# Patient Record
Sex: FEMALE | Race: OTHER | HISPANIC OR LATINO | URBAN - METROPOLITAN AREA
[De-identification: names, ages, dates, MRNs, and addresses within clinical notes are randomized per-mention and may not be internally consistent; named-entity substitution may affect disease eponyms.]

---

## 2017-12-14 ENCOUNTER — EMERGENCY (EMERGENCY)
Facility: HOSPITAL | Age: 22
LOS: 1 days | Discharge: HOME | End: 2017-12-14
Attending: EMERGENCY MEDICINE
Payer: MEDICAID

## 2017-12-14 VITALS
DIASTOLIC BLOOD PRESSURE: 66 MMHG | OXYGEN SATURATION: 100 % | HEART RATE: 68 BPM | RESPIRATION RATE: 18 BRPM | TEMPERATURE: 98 F | SYSTOLIC BLOOD PRESSURE: 106 MMHG

## 2017-12-14 VITALS
SYSTOLIC BLOOD PRESSURE: 116 MMHG | RESPIRATION RATE: 18 BRPM | DIASTOLIC BLOOD PRESSURE: 77 MMHG | OXYGEN SATURATION: 100 % | HEIGHT: 60 IN | HEART RATE: 91 BPM | WEIGHT: 126.99 LBS | TEMPERATURE: 99 F

## 2017-12-14 DIAGNOSIS — O20.0 THREATENED ABORTION: ICD-10-CM

## 2017-12-14 DIAGNOSIS — O41.8X10 OTHER SPECIFIED DISORDERS OF AMNIOTIC FLUID AND MEMBRANES, FIRST TRIMESTER, NOT APPLICABLE OR UNSPECIFIED: ICD-10-CM

## 2017-12-14 DIAGNOSIS — Z3A.01 LESS THAN 8 WEEKS GESTATION OF PREGNANCY: ICD-10-CM

## 2017-12-14 LAB
ALBUMIN SERPL ELPH-MCNC: 4.2 G/DL — SIGNIFICANT CHANGE UP (ref 3.3–5.2)
ALP SERPL-CCNC: 50 U/L — SIGNIFICANT CHANGE UP (ref 40–120)
ALT FLD-CCNC: 21 U/L — SIGNIFICANT CHANGE UP
ANION GAP SERPL CALC-SCNC: 13 MMOL/L — SIGNIFICANT CHANGE UP (ref 5–17)
AST SERPL-CCNC: 14 U/L — SIGNIFICANT CHANGE UP
BILIRUB SERPL-MCNC: 0.3 MG/DL — LOW (ref 0.4–2)
BLD GP AB SCN SERPL QL: SIGNIFICANT CHANGE UP
BUN SERPL-MCNC: 10 MG/DL — SIGNIFICANT CHANGE UP (ref 8–20)
CALCIUM SERPL-MCNC: 9.2 MG/DL — SIGNIFICANT CHANGE UP (ref 8.6–10.2)
CHLORIDE SERPL-SCNC: 100 MMOL/L — SIGNIFICANT CHANGE UP (ref 98–107)
CO2 SERPL-SCNC: 24 MMOL/L — SIGNIFICANT CHANGE UP (ref 22–29)
CREAT SERPL-MCNC: 0.38 MG/DL — LOW (ref 0.5–1.3)
GLUCOSE SERPL-MCNC: 86 MG/DL — SIGNIFICANT CHANGE UP (ref 70–115)
HCG SERPL-ACNC: SIGNIFICANT CHANGE UP MIU/ML
HCT VFR BLD CALC: 34.6 % — LOW (ref 37–47)
HGB BLD-MCNC: 11.7 G/DL — LOW (ref 12–16)
MCHC RBC-ENTMCNC: 29.3 PG — SIGNIFICANT CHANGE UP (ref 27–31)
MCHC RBC-ENTMCNC: 33.8 G/DL — SIGNIFICANT CHANGE UP (ref 32–36)
MCV RBC AUTO: 86.7 FL — SIGNIFICANT CHANGE UP (ref 81–99)
PLATELET # BLD AUTO: 292 K/UL — SIGNIFICANT CHANGE UP (ref 150–400)
POTASSIUM SERPL-MCNC: 3.9 MMOL/L — SIGNIFICANT CHANGE UP (ref 3.5–5.3)
POTASSIUM SERPL-SCNC: 3.9 MMOL/L — SIGNIFICANT CHANGE UP (ref 3.5–5.3)
PROT SERPL-MCNC: 7 G/DL — SIGNIFICANT CHANGE UP (ref 6.6–8.7)
RBC # BLD: 3.99 M/UL — LOW (ref 4.4–5.2)
RBC # FLD: 11.7 % — SIGNIFICANT CHANGE UP (ref 11–15.6)
SODIUM SERPL-SCNC: 137 MMOL/L — SIGNIFICANT CHANGE UP (ref 135–145)
TYPE + AB SCN PNL BLD: SIGNIFICANT CHANGE UP
WBC # BLD: 8.6 K/UL — SIGNIFICANT CHANGE UP (ref 4.8–10.8)
WBC # FLD AUTO: 8.6 K/UL — SIGNIFICANT CHANGE UP (ref 4.8–10.8)

## 2017-12-14 PROCEDURE — 76817 TRANSVAGINAL US OBSTETRIC: CPT

## 2017-12-14 PROCEDURE — T1013: CPT

## 2017-12-14 PROCEDURE — 80053 COMPREHEN METABOLIC PANEL: CPT

## 2017-12-14 PROCEDURE — 86900 BLOOD TYPING SEROLOGIC ABO: CPT

## 2017-12-14 PROCEDURE — 76817 TRANSVAGINAL US OBSTETRIC: CPT | Mod: 26

## 2017-12-14 PROCEDURE — 85027 COMPLETE CBC AUTOMATED: CPT

## 2017-12-14 PROCEDURE — 99284 EMERGENCY DEPT VISIT MOD MDM: CPT | Mod: 25

## 2017-12-14 PROCEDURE — 86901 BLOOD TYPING SEROLOGIC RH(D): CPT

## 2017-12-14 PROCEDURE — 76801 OB US < 14 WKS SINGLE FETUS: CPT

## 2017-12-14 PROCEDURE — 86850 RBC ANTIBODY SCREEN: CPT

## 2017-12-14 PROCEDURE — 99285 EMERGENCY DEPT VISIT HI MDM: CPT

## 2017-12-14 PROCEDURE — 76801 OB US < 14 WKS SINGLE FETUS: CPT | Mod: 26

## 2017-12-14 PROCEDURE — 36415 COLL VENOUS BLD VENIPUNCTURE: CPT

## 2017-12-14 PROCEDURE — 84702 CHORIONIC GONADOTROPIN TEST: CPT

## 2017-12-14 NOTE — ED ADULT TRIAGE NOTE - CHIEF COMPLAINT QUOTE
Pt BIBA A&Ox3 6 weeks pregnant, G2:P1 c/o vaginal bleeding with associated lower abdominal pain since this AM.

## 2017-12-14 NOTE — ED PROVIDER NOTE - MEDICAL DECISION MAKING DETAILS
early pregnancy and vag bleeding prob threatened  will get w/u and gyn eval and dispo as per their recommendation

## 2017-12-14 NOTE — CONSULT NOTE ADULT - SUBJECTIVE AND OBJECTIVE BOX
21 yo  at 7 weeks and 3 days by LMP presents to the ED with vaginal bleeding and pelvic pain which began at 9AM this morning and resolved by 11AM. Patient states she noticed bright red blood while on the toilet this morning. She states she wiped with small amount of blood and continued to bleed a little. She states the blood loss was mild-moderate and only used one pad today. Her pain was cramping in nature, 4/10, and similar to that of menstrual cramps. Currently she denies any bleeding, cramping or contractions. She denies LOF. Patient is seen at [facility] and has only had one visit for a urine pregnancy test.    LMP: 10/23/2017  POBhx: 2016  at 40 weeks 3 days  Menarche: 11, Cycles: every 25 days, Lasts: 3 days  PMH: none  PSH: none  Meds: PNV  Allergies: NKDA  Social: neg x3  FH: Grandmother with DM    T(C): 36.8 (17 @ 11:45), Max: 37 (17 @ 09:57)  HR: 67 (17 @ 11:45) (67 - 91)  BP: 100/64 (17 @ 13:45)   RR: 16 (17 @ 11:45) (16 - 18)  SpO2: 99% (17 @ 11:45) (99% - 100%)    Physical Exam:   GENERAL: NAD, well-groomed, well-developed  CHEST/LUNG: CTA; No rales, rhonchi, wheezing, or rubs  HEART: RRR; Normal S1/S2, No murmurs, rubs, or gallops  ABDOMEN: Soft, Nontender, Nondistended; Bowel sounds present, uterus nonpalpable  CE: closed, scant blood in the vaginal vault     Labs:                        11.7   8.6   )-----------( 292      ( 14 Dec 2017 11:51 )             34.6         137  |  100  |  10.0  ----------------------------<  86  3.9   |  24.0  |  0.38<L>    Ca    9.2      14 Dec 2017 11:51    TPro  7.0  /  Alb  4.2  /  TBili  0.3<L>  /  DBili  x   /  AST  14  /  ALT  21  /  AlkPhos  50  12-14    Serum HC,843    Blood Type: A+    Imaging  < from: US Echo OB <=14 Weeks, First Gestation (17 @ 12:56) >  TECHNIQUE: Transabdominal and Endovaginal pelvic sonogram.     FINDINGS:    Uterus: 11.9 x 6 x 7.9 cm. Small subchorionic hemorrhage measuring 4 x   2.2 x 3 cm.    Gestational Sac Size (mean): 3.6 cm    Crown Rump Length: 1.8 cm     Estimated Gestational Age: 8 weeks 4 days    Yolk Sac: Normal.    Fetal Heart Rate: 171    Right ovary: 3.9 x 2.1 x 1.8 cm. 3.1 cm corpus luteal cyst.    Left ovary: 2.5 x 1.5 x 2.6 cm. Within normal limits.    Fluid: None.    IMPRESSION:    Single live intrauterine pregnancy. Small subchorionic hemorrhage.  Estimated gestational age of 8 weeks 4 days.  Estimated due date of  2018.    < end of copied text >

## 2017-12-14 NOTE — CONSULT NOTE ADULT - ASSESSMENT
21 yo  at 7 weeks and 3 days by LMP, 8 weeks 4 days by sono presents for vaginal bleeding and pelvic cramps. 21 yo  at 7 weeks and 3 days by LMP, 8 weeks 4 days by sono presents for vaginal bleeding and pelvic cramps.  Threatened

## 2017-12-14 NOTE — ED ADULT NURSE NOTE - OBJECTIVE STATEMENT
pt via  states she is 6 weeks pregnant, went to use the rest room this morning and started bleeding. pt states she started having pain to her lower abdomen also. pt denies urinary symptoms or cva tenderness. lung fields clear to auscultation and abdomen is soft on palpation

## 2017-12-14 NOTE — ED PROVIDER NOTE - PROGRESS NOTE DETAILS
GYn Dr GOMEZ will see for gyn eval GYN in ED, saw pt, assessment--threatened ab, informed pt of threatened ab precautions, pt knows she has an appointment with her OB/GYN on Wednesday for exam and sono.

## 2017-12-14 NOTE — CONSULT NOTE ADULT - ATTENDING COMMENTS
patient seen and examined at the bedside in ER  on exam no active bleeding noted  sono with live embryo with cardiac activity and subchorionic hemorrhage  explained to patient the implication of the findings and encouraged followup  patient appointment at [facility] on 12/20  patient stable for discharge from ER

## 2017-12-14 NOTE — CONSULT NOTE ADULT - PROBLEM SELECTOR RECOMMENDATION 9
Vaginal bleeding with closed cervical os  OB sono: small subchorionic bleeding  Blood type A+, no rhogam required  Refrain from sex to prevent cervical trauma  Outpatient follow up on Wednesday, repeat sono  Cleared for discharge from OB standpoint with strong precautions

## 2017-12-14 NOTE — ED PROVIDER NOTE - OBJECTIVE STATEMENT
pt wants  to translate 21 y/o female  states lmp 23 oct 7 weeks by pregnanacy test at [facility] clinic was well until about 9 am she noted pelvic cramping and bleeding cramping a little now

## 2017-12-14 NOTE — ED ADULT NURSE REASSESSMENT NOTE - NS ED NURSE REASSESS COMMENT FT1
pt s/p ob consult informed via  of ultra sound results. pt informed by ob resident to not do heavy lifting or pushing, to abstain from sexual intercourse and to take it easy at work until cleared by OB. pt stated her understanding. awaiting OB primary for speculum exam and dispo. pt with low BP normal saline hung

## 2018-02-08 ENCOUNTER — NON-APPOINTMENT (OUTPATIENT)
Age: 23
End: 2018-02-08

## 2018-02-08 ENCOUNTER — APPOINTMENT (OUTPATIENT)
Dept: OBGYN | Facility: CLINIC | Age: 23
End: 2018-02-08
Payer: MEDICAID

## 2018-02-08 ENCOUNTER — LABORATORY RESULT (OUTPATIENT)
Age: 23
End: 2018-02-08

## 2018-02-08 VITALS
SYSTOLIC BLOOD PRESSURE: 118 MMHG | BODY MASS INDEX: 23 KG/M2 | DIASTOLIC BLOOD PRESSURE: 74 MMHG | HEART RATE: 102 BPM | HEIGHT: 62 IN | WEIGHT: 125 LBS

## 2018-02-08 DIAGNOSIS — Z00.00 ENCOUNTER FOR GENERAL ADULT MEDICAL EXAMINATION W/OUT ABNORMAL FINDINGS: ICD-10-CM

## 2018-02-08 PROCEDURE — 76816 OB US FOLLOW-UP PER FETUS: CPT

## 2018-02-08 PROCEDURE — 99214 OFFICE O/P EST MOD 30 MIN: CPT

## 2018-02-09 LAB
ABO + RH PNL BLD: NORMAL
BASOPHILS # BLD AUTO: 0.01 K/UL
BASOPHILS NFR BLD AUTO: 0.1 %
BLD GP AB SCN SERPL QL: NORMAL
C TRACH RRNA SPEC QL NAA+PROBE: NOT DETECTED
EOSINOPHIL # BLD AUTO: 0.14 K/UL
EOSINOPHIL NFR BLD AUTO: 1.6 %
HBA1C MFR BLD HPLC: 5 %
HBV SURFACE AG SER QL: NONREACTIVE
HCG SERPL-MCNC: NORMAL MIU/ML
HCT VFR BLD CALC: 34.3 %
HGB BLD-MCNC: 10.9 G/DL
HIV1+2 AB SPEC QL IA.RAPID: NONREACTIVE
HIVABINT: NORMAL
HPV HIGH+LOW RISK DNA PNL CVX: NOT DETECTED
IMM GRANULOCYTES NFR BLD AUTO: 0.2 %
LYMPHOCYTES # BLD AUTO: 1.75 K/UL
LYMPHOCYTES NFR BLD AUTO: 20.6 %
MAN DIFF?: NORMAL
MCHC RBC-ENTMCNC: 28.6 PG
MCHC RBC-ENTMCNC: 31.8 GM/DL
MCV RBC AUTO: 90 FL
MEV IGG FLD QL IA: 156 AU/ML
MEV IGG+IGM SER-IMP: POSITIVE
MONOCYTES # BLD AUTO: 0.33 K/UL
MONOCYTES NFR BLD AUTO: 3.9 %
MUV AB SER-ACNC: POSITIVE
MUV IGG SER QL IA: 72.9 AU/ML
N GONORRHOEA RRNA SPEC QL NAA+PROBE: NOT DETECTED
NEUTROPHILS # BLD AUTO: 6.25 K/UL
NEUTROPHILS NFR BLD AUTO: 73.6 %
PLATELET # BLD AUTO: 267 K/UL
RBC # BLD: 3.81 M/UL
RBC # FLD: 13.1 %
RUBV IGG FLD-ACNC: 5.3 INDEX
RUBV IGG SER-IMP: POSITIVE
SOURCE TP AMPLIFICATION: NORMAL
T PALLIDUM AB SER QL IA: NEGATIVE
VZV AB TITR SER: NEGATIVE
VZV IGG SER IF-ACNC: 27.4 INDEX
WBC # FLD AUTO: 8.5 K/UL

## 2018-02-12 LAB
ADJUSTED MITOGEN: >10 IU/ML
ADJUSTED TB AG: 0.14 IU/ML
B19V IGG SER QL IA: 0.4 INDEX
B19V IGG+IGM SER-IMP: NEGATIVE
B19V IGG+IGM SER-IMP: NORMAL
B19V IGM FLD-ACNC: 0.2 INDEX
B19V IGM SER-ACNC: NEGATIVE
BACTERIA UR CULT: NORMAL
M TB IFN-G BLD-IMP: NEGATIVE
QUANTIFERON GOLD NIL: 0.03 IU/ML

## 2018-02-14 ENCOUNTER — MEDICATION RENEWAL (OUTPATIENT)
Age: 23
End: 2018-02-14

## 2018-02-14 LAB — CYTOLOGY CVX/VAG DOC THIN PREP: NORMAL

## 2018-02-14 RX ORDER — MULTIVIT-MIN 60/IRON FUM/FOLIC 27 MG-1 MG
27-1 TABLET ORAL DAILY
Qty: 90 | Refills: 2 | Status: DISCONTINUED | COMMUNITY
Start: 2018-02-08 | End: 2018-02-14

## 2018-02-15 LAB — FMR1 GENE MUT ANL BLD/T: NORMAL

## 2018-02-16 LAB
AR GENE MUT ANL BLD/T: NORMAL
CFTR MUT TESTED BLD/T: NORMAL

## 2018-02-26 ENCOUNTER — APPOINTMENT (OUTPATIENT)
Dept: OBGYN | Facility: CLINIC | Age: 23
End: 2018-02-26
Payer: MEDICAID

## 2018-02-26 ENCOUNTER — APPOINTMENT (OUTPATIENT)
Dept: ANTEPARTUM | Facility: CLINIC | Age: 23
End: 2018-02-26

## 2018-02-26 ENCOUNTER — NON-APPOINTMENT (OUTPATIENT)
Age: 23
End: 2018-02-26

## 2018-02-26 VITALS — DIASTOLIC BLOOD PRESSURE: 73 MMHG | HEART RATE: 103 BPM | WEIGHT: 130 LBS | SYSTOLIC BLOOD PRESSURE: 120 MMHG

## 2018-02-26 PROCEDURE — 99213 OFFICE O/P EST LOW 20 MIN: CPT

## 2018-03-05 ENCOUNTER — APPOINTMENT (OUTPATIENT)
Dept: ANTEPARTUM | Facility: CLINIC | Age: 23
End: 2018-03-05
Payer: MEDICAID

## 2018-03-05 ENCOUNTER — ASOB RESULT (OUTPATIENT)
Age: 23
End: 2018-03-05

## 2018-03-05 PROCEDURE — 76811 OB US DETAILED SNGL FETUS: CPT

## 2018-03-05 PROCEDURE — 76817 TRANSVAGINAL US OBSTETRIC: CPT

## 2018-03-26 ENCOUNTER — APPOINTMENT (OUTPATIENT)
Dept: OBGYN | Facility: CLINIC | Age: 23
End: 2018-03-26
Payer: MEDICAID

## 2018-03-26 ENCOUNTER — NON-APPOINTMENT (OUTPATIENT)
Age: 23
End: 2018-03-26

## 2018-03-26 VITALS — WEIGHT: 137 LBS | HEART RATE: 74 BPM | DIASTOLIC BLOOD PRESSURE: 99 MMHG | SYSTOLIC BLOOD PRESSURE: 109 MMHG

## 2018-03-26 PROCEDURE — 90658 IIV3 VACCINE SPLT 0.5 ML IM: CPT

## 2018-03-26 PROCEDURE — 99213 OFFICE O/P EST LOW 20 MIN: CPT

## 2018-03-26 PROCEDURE — 36415 COLL VENOUS BLD VENIPUNCTURE: CPT

## 2018-03-26 PROCEDURE — 90471 IMMUNIZATION ADMIN: CPT

## 2018-03-27 ENCOUNTER — MED ADMIN CHARGE (OUTPATIENT)
Age: 23
End: 2018-03-27

## 2018-03-28 LAB
GLUCOSE 1H P 50 G GLC PO SERPL-MCNC: 60 MG/DL
HGB A MFR BLD: 96.7 %
HGB A2 MFR BLD: 3.3 %
HGB FRACT BLD-IMP: NORMAL

## 2018-03-30 ENCOUNTER — APPOINTMENT (OUTPATIENT)
Dept: ANTEPARTUM | Facility: CLINIC | Age: 23
End: 2018-03-30

## 2018-04-16 ENCOUNTER — NON-APPOINTMENT (OUTPATIENT)
Age: 23
End: 2018-04-16

## 2018-04-16 ENCOUNTER — APPOINTMENT (OUTPATIENT)
Dept: OBGYN | Facility: CLINIC | Age: 23
End: 2018-04-16
Payer: MEDICAID

## 2018-04-16 VITALS — SYSTOLIC BLOOD PRESSURE: 120 MMHG | DIASTOLIC BLOOD PRESSURE: 74 MMHG | WEIGHT: 143 LBS | HEART RATE: 95 BPM

## 2018-04-16 PROCEDURE — 99213 OFFICE O/P EST LOW 20 MIN: CPT

## 2018-04-30 ENCOUNTER — APPOINTMENT (OUTPATIENT)
Dept: ANTEPARTUM | Facility: CLINIC | Age: 23
End: 2018-04-30
Payer: MEDICAID

## 2018-04-30 ENCOUNTER — ASOB RESULT (OUTPATIENT)
Age: 23
End: 2018-04-30

## 2018-04-30 PROCEDURE — 76816 OB US FOLLOW-UP PER FETUS: CPT

## 2018-04-30 PROCEDURE — 76821 MIDDLE CEREBRAL ARTERY ECHO: CPT

## 2018-05-07 ENCOUNTER — APPOINTMENT (OUTPATIENT)
Dept: OBGYN | Facility: CLINIC | Age: 23
End: 2018-05-07
Payer: MEDICAID

## 2018-05-07 ENCOUNTER — NON-APPOINTMENT (OUTPATIENT)
Age: 23
End: 2018-05-07

## 2018-05-07 VITALS
SYSTOLIC BLOOD PRESSURE: 97 MMHG | DIASTOLIC BLOOD PRESSURE: 60 MMHG | WEIGHT: 147 LBS | HEART RATE: 74 BPM | HEIGHT: 62 IN | BODY MASS INDEX: 27.05 KG/M2

## 2018-05-07 PROCEDURE — 90715 TDAP VACCINE 7 YRS/> IM: CPT

## 2018-05-07 PROCEDURE — 99213 OFFICE O/P EST LOW 20 MIN: CPT

## 2018-05-07 PROCEDURE — 90471 IMMUNIZATION ADMIN: CPT

## 2018-05-09 ENCOUNTER — CHART COPY (OUTPATIENT)
Age: 23
End: 2018-05-09

## 2018-05-14 ENCOUNTER — ASOB RESULT (OUTPATIENT)
Age: 23
End: 2018-05-14

## 2018-05-14 ENCOUNTER — APPOINTMENT (OUTPATIENT)
Dept: ANTEPARTUM | Facility: CLINIC | Age: 23
End: 2018-05-14
Payer: MEDICAID

## 2018-05-14 PROCEDURE — 76821 MIDDLE CEREBRAL ARTERY ECHO: CPT

## 2018-05-14 PROCEDURE — 76816 OB US FOLLOW-UP PER FETUS: CPT

## 2018-05-14 PROCEDURE — 76819 FETAL BIOPHYS PROFIL W/O NST: CPT

## 2018-05-21 ENCOUNTER — NON-APPOINTMENT (OUTPATIENT)
Age: 23
End: 2018-05-21

## 2018-05-21 ENCOUNTER — APPOINTMENT (OUTPATIENT)
Dept: OBGYN | Facility: CLINIC | Age: 23
End: 2018-05-21
Payer: MEDICAID

## 2018-05-21 VITALS — WEIGHT: 151 LBS | HEART RATE: 96 BPM | SYSTOLIC BLOOD PRESSURE: 114 MMHG | DIASTOLIC BLOOD PRESSURE: 73 MMHG

## 2018-05-21 PROCEDURE — 99213 OFFICE O/P EST LOW 20 MIN: CPT

## 2018-05-29 ENCOUNTER — ASOB RESULT (OUTPATIENT)
Age: 23
End: 2018-05-29

## 2018-05-29 ENCOUNTER — APPOINTMENT (OUTPATIENT)
Dept: ANTEPARTUM | Facility: CLINIC | Age: 23
End: 2018-05-29
Payer: MEDICAID

## 2018-05-29 PROCEDURE — 76818 FETAL BIOPHYS PROFILE W/NST: CPT

## 2018-06-04 ENCOUNTER — NON-APPOINTMENT (OUTPATIENT)
Age: 23
End: 2018-06-04

## 2018-06-04 ENCOUNTER — APPOINTMENT (OUTPATIENT)
Dept: OBGYN | Facility: CLINIC | Age: 23
End: 2018-06-04
Payer: MEDICAID

## 2018-06-04 VITALS
SYSTOLIC BLOOD PRESSURE: 118 MMHG | HEIGHT: 62 IN | DIASTOLIC BLOOD PRESSURE: 62 MMHG | HEART RATE: 80 BPM | WEIGHT: 154 LBS | BODY MASS INDEX: 28.34 KG/M2

## 2018-06-04 DIAGNOSIS — Z3A.28 28 WEEKS GESTATION OF PREGNANCY: ICD-10-CM

## 2018-06-04 DIAGNOSIS — Z3A.24 24 WEEKS GESTATION OF PREGNANCY: ICD-10-CM

## 2018-06-04 DIAGNOSIS — Z3A.29 29 WEEKS GESTATION OF PREGNANCY: ICD-10-CM

## 2018-06-04 DIAGNOSIS — Z3A.20 20 WEEKS GESTATION OF PREGNANCY: ICD-10-CM

## 2018-06-04 DIAGNOSIS — Z3A.17 17 WEEKS GESTATION OF PREGNANCY: ICD-10-CM

## 2018-06-04 PROCEDURE — 99213 OFFICE O/P EST LOW 20 MIN: CPT

## 2018-06-11 ENCOUNTER — APPOINTMENT (OUTPATIENT)
Dept: ANTEPARTUM | Facility: CLINIC | Age: 23
End: 2018-06-11
Payer: MEDICAID

## 2018-06-11 ENCOUNTER — ASOB RESULT (OUTPATIENT)
Age: 23
End: 2018-06-11

## 2018-06-11 PROCEDURE — 76816 OB US FOLLOW-UP PER FETUS: CPT

## 2018-06-11 PROCEDURE — 76819 FETAL BIOPHYS PROFIL W/O NST: CPT

## 2018-06-12 ENCOUNTER — APPOINTMENT (OUTPATIENT)
Dept: CARDIOLOGY | Facility: CLINIC | Age: 23
End: 2018-06-12
Payer: MEDICAID

## 2018-06-12 VITALS
RESPIRATION RATE: 14 BRPM | DIASTOLIC BLOOD PRESSURE: 62 MMHG | WEIGHT: 156 LBS | HEIGHT: 62 IN | BODY MASS INDEX: 28.71 KG/M2 | HEART RATE: 91 BPM | SYSTOLIC BLOOD PRESSURE: 110 MMHG

## 2018-06-12 DIAGNOSIS — Z78.9 OTHER SPECIFIED HEALTH STATUS: ICD-10-CM

## 2018-06-12 PROCEDURE — 93224 XTRNL ECG REC UP TO 48 HRS: CPT | Mod: 59

## 2018-06-12 PROCEDURE — 99204 OFFICE O/P NEW MOD 45 MIN: CPT

## 2018-06-12 PROCEDURE — 93000 ELECTROCARDIOGRAM COMPLETE: CPT | Mod: 59

## 2018-06-12 RX ORDER — FERROUS SULFATE 325(65) MG
325 (65 FE) TABLET ORAL 3 TIMES DAILY
Qty: 30 | Refills: 6 | Status: ACTIVE | COMMUNITY
Start: 2018-02-26

## 2018-06-14 ENCOUNTER — NON-APPOINTMENT (OUTPATIENT)
Age: 23
End: 2018-06-14

## 2018-06-14 ENCOUNTER — APPOINTMENT (OUTPATIENT)
Dept: OBGYN | Facility: CLINIC | Age: 23
End: 2018-06-14

## 2018-06-14 ENCOUNTER — OTHER (OUTPATIENT)
Age: 23
End: 2018-06-14

## 2018-06-14 PROCEDURE — 99213 OFFICE O/P EST LOW 20 MIN: CPT

## 2018-06-16 LAB
GP B STREP DNA SPEC QL NAA+PROBE: NORMAL
GP B STREP DNA SPEC QL NAA+PROBE: NOT DETECTED
HIV1+2 AB SPEC QL IA.RAPID: NONREACTIVE
SOURCE GBS: NORMAL

## 2018-06-25 ENCOUNTER — APPOINTMENT (OUTPATIENT)
Dept: OBGYN | Facility: CLINIC | Age: 23
End: 2018-06-25
Payer: MEDICAID

## 2018-06-25 ENCOUNTER — NON-APPOINTMENT (OUTPATIENT)
Age: 23
End: 2018-06-25

## 2018-06-25 VITALS
HEIGHT: 62 IN | WEIGHT: 160 LBS | SYSTOLIC BLOOD PRESSURE: 117 MMHG | BODY MASS INDEX: 29.44 KG/M2 | DIASTOLIC BLOOD PRESSURE: 70 MMHG | HEART RATE: 68 BPM

## 2018-06-25 PROCEDURE — 99213 OFFICE O/P EST LOW 20 MIN: CPT

## 2018-06-29 PROBLEM — Z3A.37 37 WEEKS GESTATION OF PREGNANCY: Status: RESOLVED | Noted: 2018-06-25 | Resolved: 2018-06-29

## 2018-06-29 PROBLEM — Z3A.35 35 WEEKS GESTATION OF PREGNANCY: Status: RESOLVED | Noted: 2018-06-14 | Resolved: 2018-06-29

## 2018-06-29 PROBLEM — Z3A.34 34 WEEKS GESTATION OF PREGNANCY: Status: RESOLVED | Noted: 2018-06-04 | Resolved: 2018-06-29

## 2018-06-29 PROBLEM — Z3A.32 32 WEEKS GESTATION OF PREGNANCY: Status: RESOLVED | Noted: 2018-05-21 | Resolved: 2018-06-29

## 2018-07-02 ENCOUNTER — NON-APPOINTMENT (OUTPATIENT)
Age: 23
End: 2018-07-02

## 2018-07-02 ENCOUNTER — APPOINTMENT (OUTPATIENT)
Dept: OBGYN | Facility: CLINIC | Age: 23
End: 2018-07-02
Payer: MEDICAID

## 2018-07-02 VITALS — DIASTOLIC BLOOD PRESSURE: 64 MMHG | WEIGHT: 161 LBS | SYSTOLIC BLOOD PRESSURE: 110 MMHG | HEART RATE: 75 BPM

## 2018-07-02 DIAGNOSIS — Z3A.32 32 WEEKS GESTATION OF PREGNANCY: ICD-10-CM

## 2018-07-02 DIAGNOSIS — Z3A.35 35 WEEKS GESTATION OF PREGNANCY: ICD-10-CM

## 2018-07-02 DIAGNOSIS — Z3A.38 38 WEEKS GESTATION OF PREGNANCY: ICD-10-CM

## 2018-07-02 DIAGNOSIS — Z3A.34 34 WEEKS GESTATION OF PREGNANCY: ICD-10-CM

## 2018-07-02 DIAGNOSIS — Z3A.37 37 WEEKS GESTATION OF PREGNANCY: ICD-10-CM

## 2018-07-02 PROCEDURE — 99213 OFFICE O/P EST LOW 20 MIN: CPT

## 2018-07-03 PROBLEM — Z3A.38 38 WEEKS GESTATION OF PREGNANCY: Status: RESOLVED | Noted: 2018-07-02 | Resolved: 2018-07-03

## 2018-07-09 ENCOUNTER — APPOINTMENT (OUTPATIENT)
Dept: CARDIOLOGY | Facility: CLINIC | Age: 23
End: 2018-07-09

## 2018-07-09 ENCOUNTER — ASOB RESULT (OUTPATIENT)
Age: 23
End: 2018-07-09

## 2018-07-09 ENCOUNTER — APPOINTMENT (OUTPATIENT)
Dept: ANTEPARTUM | Facility: CLINIC | Age: 23
End: 2018-07-09
Payer: MEDICAID

## 2018-07-09 ENCOUNTER — APPOINTMENT (OUTPATIENT)
Dept: OBGYN | Facility: CLINIC | Age: 23
End: 2018-07-09
Payer: MEDICAID

## 2018-07-09 VITALS
BODY MASS INDEX: 30.44 KG/M2 | HEART RATE: 69 BPM | DIASTOLIC BLOOD PRESSURE: 72 MMHG | WEIGHT: 161.25 LBS | HEIGHT: 61 IN | SYSTOLIC BLOOD PRESSURE: 114 MMHG

## 2018-07-09 PROCEDURE — 99213 OFFICE O/P EST LOW 20 MIN: CPT

## 2018-07-09 PROCEDURE — 76816 OB US FOLLOW-UP PER FETUS: CPT

## 2018-07-09 PROCEDURE — 76819 FETAL BIOPHYS PROFIL W/O NST: CPT

## 2018-07-11 DIAGNOSIS — Z3A.38 38 WEEKS GESTATION OF PREGNANCY: ICD-10-CM

## 2018-07-16 ENCOUNTER — NON-APPOINTMENT (OUTPATIENT)
Age: 23
End: 2018-07-16

## 2018-07-16 ENCOUNTER — APPOINTMENT (OUTPATIENT)
Dept: OBGYN | Facility: CLINIC | Age: 23
End: 2018-07-16
Payer: MEDICAID

## 2018-07-16 VITALS
WEIGHT: 164.13 LBS | BODY MASS INDEX: 30.99 KG/M2 | DIASTOLIC BLOOD PRESSURE: 68 MMHG | HEIGHT: 61 IN | SYSTOLIC BLOOD PRESSURE: 106 MMHG

## 2018-07-16 PROCEDURE — 99213 OFFICE O/P EST LOW 20 MIN: CPT

## 2018-07-23 ENCOUNTER — NON-APPOINTMENT (OUTPATIENT)
Age: 23
End: 2018-07-23

## 2018-07-23 ENCOUNTER — APPOINTMENT (OUTPATIENT)
Dept: OBGYN | Facility: CLINIC | Age: 23
End: 2018-07-23
Payer: MEDICAID

## 2018-07-23 VITALS — HEART RATE: 85 BPM | SYSTOLIC BLOOD PRESSURE: 125 MMHG | WEIGHT: 165 LBS | DIASTOLIC BLOOD PRESSURE: 77 MMHG

## 2018-07-23 DIAGNOSIS — Z3A.38 38 WEEKS GESTATION OF PREGNANCY: ICD-10-CM

## 2018-07-23 PROCEDURE — 99213 OFFICE O/P EST LOW 20 MIN: CPT

## 2018-07-23 RX ORDER — TERCONAZOLE 4 MG/G
0.4 CREAM VAGINAL DAILY
Qty: 1 | Refills: 3 | Status: DISCONTINUED | COMMUNITY
Start: 2018-07-09 | End: 2018-07-23

## 2018-07-26 ENCOUNTER — TRANSCRIPTION ENCOUNTER (OUTPATIENT)
Age: 23
End: 2018-07-26

## 2018-07-27 ENCOUNTER — INPATIENT (INPATIENT)
Facility: HOSPITAL | Age: 23
LOS: 2 days | Discharge: HOME | End: 2018-07-30
Attending: OBSTETRICS & GYNECOLOGY | Admitting: OBSTETRICS & GYNECOLOGY
Payer: COMMERCIAL

## 2018-07-27 ENCOUNTER — TRANSCRIPTION ENCOUNTER (OUTPATIENT)
Age: 23
End: 2018-07-27

## 2018-07-27 VITALS
SYSTOLIC BLOOD PRESSURE: 128 MMHG | HEART RATE: 88 BPM | RESPIRATION RATE: 14 BRPM | OXYGEN SATURATION: 96 % | DIASTOLIC BLOOD PRESSURE: 75 MMHG | TEMPERATURE: 98 F

## 2018-07-27 DIAGNOSIS — O35.9XX0 MATERNAL CARE FOR (SUSPECTED) FETAL ABNORMALITY AND DAMAGE, UNSPECIFIED, NOT APPLICABLE OR UNSPECIFIED: ICD-10-CM

## 2018-07-27 LAB
APPEARANCE UR: ABNORMAL
BACTERIA # UR AUTO: ABNORMAL
BASE EXCESS BLDCOA CALC-SCNC: -1.6 MMOL/L — SIGNIFICANT CHANGE UP (ref -2–2)
BASE EXCESS BLDCOV CALC-SCNC: -0.6 MMOL/L — SIGNIFICANT CHANGE UP (ref -2–2)
BASOPHILS # BLD AUTO: 0 K/UL — SIGNIFICANT CHANGE UP (ref 0–0.2)
BASOPHILS NFR BLD AUTO: 0.1 % — SIGNIFICANT CHANGE UP (ref 0–2)
BILIRUB UR-MCNC: NEGATIVE — SIGNIFICANT CHANGE UP
BLD GP AB SCN SERPL QL: SIGNIFICANT CHANGE UP
COLOR SPEC: YELLOW — SIGNIFICANT CHANGE UP
DIFF PNL FLD: ABNORMAL
EOSINOPHIL # BLD AUTO: 0.1 K/UL — SIGNIFICANT CHANGE UP (ref 0–0.5)
EOSINOPHIL NFR BLD AUTO: 0.8 % — SIGNIFICANT CHANGE UP (ref 0–6)
EPI CELLS # UR: ABNORMAL
GAS PNL BLDCOV: 7.33 — SIGNIFICANT CHANGE UP (ref 7.25–7.45)
GLUCOSE UR QL: NEGATIVE MG/DL — SIGNIFICANT CHANGE UP
HCO3 BLDCOA-SCNC: 22 MMOL/L — SIGNIFICANT CHANGE UP (ref 21–29)
HCO3 BLDCOV-SCNC: 23 MMOL/L — SIGNIFICANT CHANGE UP (ref 21–29)
HCT VFR BLD CALC: 35.6 % — LOW (ref 37–47)
HGB BLD-MCNC: 11.7 G/DL — LOW (ref 12–16)
KETONES UR-MCNC: NEGATIVE — SIGNIFICANT CHANGE UP
LEUKOCYTE ESTERASE UR-ACNC: ABNORMAL
LYMPHOCYTES # BLD AUTO: 16.5 % — LOW (ref 20–55)
LYMPHOCYTES # BLD AUTO: 2.1 K/UL — SIGNIFICANT CHANGE UP (ref 1–4.8)
MCHC RBC-ENTMCNC: 30.4 PG — SIGNIFICANT CHANGE UP (ref 27–31)
MCHC RBC-ENTMCNC: 32.9 G/DL — SIGNIFICANT CHANGE UP (ref 32–36)
MCV RBC AUTO: 92.5 FL — SIGNIFICANT CHANGE UP (ref 81–99)
MONOCYTES # BLD AUTO: 0.8 K/UL — SIGNIFICANT CHANGE UP (ref 0–0.8)
MONOCYTES NFR BLD AUTO: 6.1 % — SIGNIFICANT CHANGE UP (ref 3–10)
NEUTROPHILS # BLD AUTO: 9.6 K/UL — HIGH (ref 1.8–8)
NEUTROPHILS NFR BLD AUTO: 75.9 % — HIGH (ref 37–73)
NITRITE UR-MCNC: NEGATIVE — SIGNIFICANT CHANGE UP
PCO2 BLDCOA: 50 MMHG — SIGNIFICANT CHANGE UP (ref 32–68)
PCO2 BLDCOV: 49.3 MMHG — SIGNIFICANT CHANGE UP (ref 29–53)
PH BLDCOA: 7.31 — SIGNIFICANT CHANGE UP (ref 7.18–7.38)
PH UR: 7 — SIGNIFICANT CHANGE UP (ref 5–8)
PLATELET # BLD AUTO: 159 K/UL — SIGNIFICANT CHANGE UP (ref 150–400)
PO2 BLDCOA: 23.5 MMHG — SIGNIFICANT CHANGE UP (ref 5.7–30.5)
PO2 BLDCOA: 26.4 MMHG — SIGNIFICANT CHANGE UP (ref 17–41)
PROT UR-MCNC: NEGATIVE MG/DL — SIGNIFICANT CHANGE UP
RBC # BLD: 3.85 M/UL — LOW (ref 4.4–5.2)
RBC # FLD: 13 % — SIGNIFICANT CHANGE UP (ref 11–15.6)
RBC CASTS # UR COMP ASSIST: ABNORMAL /HPF (ref 0–4)
SAO2 % BLDCOA: SIGNIFICANT CHANGE UP
SAO2 % BLDCOV: SIGNIFICANT CHANGE UP
SP GR SPEC: 1.01 — SIGNIFICANT CHANGE UP (ref 1.01–1.02)
TYPE + AB SCN PNL BLD: SIGNIFICANT CHANGE UP
UROBILINOGEN FLD QL: NEGATIVE MG/DL — SIGNIFICANT CHANGE UP
WBC # BLD: 12.7 K/UL — HIGH (ref 4.8–10.8)
WBC # FLD AUTO: 12.7 K/UL — HIGH (ref 4.8–10.8)
WBC UR QL: ABNORMAL

## 2018-07-27 PROCEDURE — 59514 CESAREAN DELIVERY ONLY: CPT | Mod: U9

## 2018-07-27 RX ORDER — NALBUPHINE HYDROCHLORIDE 10 MG/ML
5 INJECTION, SOLUTION INTRAMUSCULAR; INTRAVENOUS; SUBCUTANEOUS ONCE
Qty: 0 | Refills: 0 | Status: DISCONTINUED | OUTPATIENT
Start: 2018-07-27 | End: 2018-07-30

## 2018-07-27 RX ORDER — SODIUM CHLORIDE 9 MG/ML
1000 INJECTION, SOLUTION INTRAVENOUS
Qty: 0 | Refills: 0 | Status: DISCONTINUED | OUTPATIENT
Start: 2018-07-27 | End: 2018-07-28

## 2018-07-27 RX ORDER — CITRIC ACID/SODIUM CITRATE 300-500 MG
30 SOLUTION, ORAL ORAL ONCE
Qty: 0 | Refills: 0 | Status: COMPLETED | OUTPATIENT
Start: 2018-07-27 | End: 2018-07-27

## 2018-07-27 RX ORDER — SODIUM CHLORIDE 9 MG/ML
1000 INJECTION, SOLUTION INTRAVENOUS
Qty: 0 | Refills: 0 | Status: DISCONTINUED | OUTPATIENT
Start: 2018-07-28 | End: 2018-07-30

## 2018-07-27 RX ORDER — KETOROLAC TROMETHAMINE 30 MG/ML
30 SYRINGE (ML) INJECTION ONCE
Qty: 0 | Refills: 0 | Status: DISCONTINUED | OUTPATIENT
Start: 2018-07-27 | End: 2018-07-28

## 2018-07-27 RX ORDER — ONDANSETRON 8 MG/1
4 TABLET, FILM COATED ORAL ONCE
Qty: 0 | Refills: 0 | Status: DISCONTINUED | OUTPATIENT
Start: 2018-07-27 | End: 2018-07-28

## 2018-07-27 RX ORDER — FERROUS SULFATE 325(65) MG
325 TABLET ORAL DAILY
Qty: 0 | Refills: 0 | Status: DISCONTINUED | OUTPATIENT
Start: 2018-07-28 | End: 2018-07-30

## 2018-07-27 RX ORDER — ACETAMINOPHEN 500 MG
1000 TABLET ORAL ONCE
Qty: 0 | Refills: 0 | Status: DISCONTINUED | OUTPATIENT
Start: 2018-07-27 | End: 2018-07-27

## 2018-07-27 RX ORDER — CEFAZOLIN SODIUM 1 G
2000 VIAL (EA) INJECTION ONCE
Qty: 0 | Refills: 0 | Status: COMPLETED | OUTPATIENT
Start: 2018-07-27 | End: 2018-07-27

## 2018-07-27 RX ORDER — OXYTOCIN 10 UNIT/ML
41.67 VIAL (ML) INJECTION
Qty: 20 | Refills: 0 | Status: DISCONTINUED | OUTPATIENT
Start: 2018-07-27 | End: 2018-07-28

## 2018-07-27 RX ORDER — DOCUSATE SODIUM 100 MG
100 CAPSULE ORAL
Qty: 0 | Refills: 0 | Status: DISCONTINUED | OUTPATIENT
Start: 2018-07-28 | End: 2018-07-30

## 2018-07-27 RX ORDER — SODIUM CHLORIDE 9 MG/ML
1000 INJECTION, SOLUTION INTRAVENOUS
Qty: 0 | Refills: 0 | Status: DISCONTINUED | OUTPATIENT
Start: 2018-07-27 | End: 2018-07-27

## 2018-07-27 RX ORDER — DIPHENHYDRAMINE HCL 50 MG
25 CAPSULE ORAL EVERY 6 HOURS
Qty: 0 | Refills: 0 | Status: DISCONTINUED | OUTPATIENT
Start: 2018-07-28 | End: 2018-07-30

## 2018-07-27 RX ORDER — LANOLIN
1 OINTMENT (GRAM) TOPICAL
Qty: 0 | Refills: 0 | Status: DISCONTINUED | OUTPATIENT
Start: 2018-07-28 | End: 2018-07-30

## 2018-07-27 RX ORDER — DIPHENHYDRAMINE HCL 50 MG
25 CAPSULE ORAL EVERY 4 HOURS
Qty: 0 | Refills: 0 | Status: DISCONTINUED | OUTPATIENT
Start: 2018-07-27 | End: 2018-07-30

## 2018-07-27 RX ORDER — KETOROLAC TROMETHAMINE 30 MG/ML
30 SYRINGE (ML) INJECTION EVERY 6 HOURS
Qty: 0 | Refills: 0 | Status: DISCONTINUED | OUTPATIENT
Start: 2018-07-27 | End: 2018-07-30

## 2018-07-27 RX ORDER — SIMETHICONE 80 MG/1
80 TABLET, CHEWABLE ORAL EVERY 4 HOURS
Qty: 0 | Refills: 0 | Status: DISCONTINUED | OUTPATIENT
Start: 2018-07-28 | End: 2018-07-30

## 2018-07-27 RX ORDER — SODIUM CHLORIDE 9 MG/ML
1000 INJECTION, SOLUTION INTRAVENOUS ONCE
Qty: 0 | Refills: 0 | Status: COMPLETED | OUTPATIENT
Start: 2018-07-27 | End: 2018-07-27

## 2018-07-27 RX ORDER — SODIUM CHLORIDE 9 MG/ML
1000 INJECTION, SOLUTION INTRAVENOUS
Qty: 0 | Refills: 0 | Status: DISCONTINUED | OUTPATIENT
Start: 2018-07-27 | End: 2018-07-30

## 2018-07-27 RX ORDER — DIPHENHYDRAMINE HCL 50 MG
25 CAPSULE ORAL ONCE
Qty: 0 | Refills: 0 | Status: DISCONTINUED | OUTPATIENT
Start: 2018-07-27 | End: 2018-07-30

## 2018-07-27 RX ORDER — TETANUS TOXOID, REDUCED DIPHTHERIA TOXOID AND ACELLULAR PERTUSSIS VACCINE, ADSORBED 5; 2.5; 8; 8; 2.5 [IU]/.5ML; [IU]/.5ML; UG/.5ML; UG/.5ML; UG/.5ML
0.5 SUSPENSION INTRAMUSCULAR ONCE
Qty: 0 | Refills: 0 | Status: COMPLETED | OUTPATIENT
Start: 2018-07-28

## 2018-07-27 RX ORDER — CITRIC ACID/SODIUM CITRATE 300-500 MG
15 SOLUTION, ORAL ORAL EVERY 4 HOURS
Qty: 0 | Refills: 0 | Status: DISCONTINUED | OUTPATIENT
Start: 2018-07-27 | End: 2018-07-27

## 2018-07-27 RX ORDER — OXYTOCIN 10 UNIT/ML
41.67 VIAL (ML) INJECTION
Qty: 20 | Refills: 0 | Status: DISCONTINUED | OUTPATIENT
Start: 2018-07-28 | End: 2018-07-30

## 2018-07-27 RX ORDER — OXYCODONE AND ACETAMINOPHEN 5; 325 MG/1; MG/1
1 TABLET ORAL
Qty: 0 | Refills: 0 | Status: DISCONTINUED | OUTPATIENT
Start: 2018-07-28 | End: 2018-07-30

## 2018-07-27 RX ORDER — OXYTOCIN 10 UNIT/ML
333.33 VIAL (ML) INJECTION
Qty: 20 | Refills: 0 | Status: COMPLETED | OUTPATIENT
Start: 2018-07-27 | End: 2018-07-27

## 2018-07-27 RX ORDER — GLYCERIN ADULT
1 SUPPOSITORY, RECTAL RECTAL AT BEDTIME
Qty: 0 | Refills: 0 | Status: DISCONTINUED | OUTPATIENT
Start: 2018-07-28 | End: 2018-07-30

## 2018-07-27 RX ORDER — NALOXONE HYDROCHLORIDE 4 MG/.1ML
0.1 SPRAY NASAL
Qty: 0 | Refills: 0 | Status: DISCONTINUED | OUTPATIENT
Start: 2018-07-27 | End: 2018-07-30

## 2018-07-27 RX ORDER — IBUPROFEN 200 MG
600 TABLET ORAL EVERY 6 HOURS
Qty: 0 | Refills: 0 | Status: DISCONTINUED | OUTPATIENT
Start: 2018-07-28 | End: 2018-07-30

## 2018-07-27 RX ORDER — OXYCODONE AND ACETAMINOPHEN 5; 325 MG/1; MG/1
2 TABLET ORAL EVERY 6 HOURS
Qty: 0 | Refills: 0 | Status: DISCONTINUED | OUTPATIENT
Start: 2018-07-28 | End: 2018-07-30

## 2018-07-27 RX ORDER — ACETAMINOPHEN 500 MG
650 TABLET ORAL EVERY 6 HOURS
Qty: 0 | Refills: 0 | Status: DISCONTINUED | OUTPATIENT
Start: 2018-07-28 | End: 2018-07-30

## 2018-07-27 RX ORDER — NALBUPHINE HYDROCHLORIDE 10 MG/ML
2.5 INJECTION, SOLUTION INTRAMUSCULAR; INTRAVENOUS; SUBCUTANEOUS EVERY 6 HOURS
Qty: 0 | Refills: 0 | Status: DISCONTINUED | OUTPATIENT
Start: 2018-07-27 | End: 2018-07-30

## 2018-07-27 RX ORDER — CEFAZOLIN SODIUM 1 G
2000 VIAL (EA) INJECTION EVERY 8 HOURS
Qty: 0 | Refills: 0 | Status: COMPLETED | OUTPATIENT
Start: 2018-07-27 | End: 2018-07-28

## 2018-07-27 RX ORDER — OXYTOCIN 10 UNIT/ML
333.33 VIAL (ML) INJECTION
Qty: 20 | Refills: 0 | Status: DISCONTINUED | OUTPATIENT
Start: 2018-07-27 | End: 2018-07-27

## 2018-07-27 RX ORDER — OXYTOCIN 10 UNIT/ML
41.67 VIAL (ML) INJECTION
Qty: 20 | Refills: 0 | Status: DISCONTINUED | OUTPATIENT
Start: 2018-07-27 | End: 2018-07-30

## 2018-07-27 RX ORDER — ONDANSETRON 8 MG/1
4 TABLET, FILM COATED ORAL EVERY 6 HOURS
Qty: 0 | Refills: 0 | Status: DISCONTINUED | OUTPATIENT
Start: 2018-07-27 | End: 2018-07-30

## 2018-07-27 RX ORDER — ACETAMINOPHEN 500 MG
1000 TABLET ORAL ONCE
Qty: 0 | Refills: 0 | Status: DISCONTINUED | OUTPATIENT
Start: 2018-07-27 | End: 2018-07-30

## 2018-07-27 RX ADMIN — Medication 1000 MILLIUNIT(S)/MIN: at 21:52

## 2018-07-27 RX ADMIN — Medication 125 MILLIUNIT(S)/MIN: at 23:09

## 2018-07-27 RX ADMIN — Medication 100 MILLIGRAM(S): at 20:02

## 2018-07-27 RX ADMIN — SODIUM CHLORIDE 125 MILLILITER(S): 9 INJECTION, SOLUTION INTRAVENOUS at 12:15

## 2018-07-27 RX ADMIN — SODIUM CHLORIDE 2000 MILLILITER(S): 9 INJECTION, SOLUTION INTRAVENOUS at 09:38

## 2018-07-27 RX ADMIN — SODIUM CHLORIDE 125 MILLILITER(S): 9 INJECTION, SOLUTION INTRAVENOUS at 08:00

## 2018-07-27 RX ADMIN — Medication 30 MILLILITER(S): at 09:40

## 2018-07-28 LAB
BASOPHILS # BLD AUTO: 0 K/UL — SIGNIFICANT CHANGE UP (ref 0–0.2)
BASOPHILS NFR BLD AUTO: 0.1 % — SIGNIFICANT CHANGE UP (ref 0–2)
EOSINOPHIL # BLD AUTO: 0 K/UL — SIGNIFICANT CHANGE UP (ref 0–0.5)
EOSINOPHIL NFR BLD AUTO: 0.1 % — SIGNIFICANT CHANGE UP (ref 0–6)
HCT VFR BLD CALC: 25.5 % — LOW (ref 37–47)
HGB BLD-MCNC: 8.5 G/DL — LOW (ref 12–16)
LYMPHOCYTES # BLD AUTO: 1.4 K/UL — SIGNIFICANT CHANGE UP (ref 1–4.8)
LYMPHOCYTES # BLD AUTO: 8.7 % — LOW (ref 20–55)
MCHC RBC-ENTMCNC: 30.8 PG — SIGNIFICANT CHANGE UP (ref 27–31)
MCHC RBC-ENTMCNC: 33.3 G/DL — SIGNIFICANT CHANGE UP (ref 32–36)
MCV RBC AUTO: 92.4 FL — SIGNIFICANT CHANGE UP (ref 81–99)
MONOCYTES # BLD AUTO: 0.7 K/UL — SIGNIFICANT CHANGE UP (ref 0–0.8)
MONOCYTES NFR BLD AUTO: 4.5 % — SIGNIFICANT CHANGE UP (ref 3–10)
NEUTROPHILS # BLD AUTO: 13.6 K/UL — HIGH (ref 1.8–8)
NEUTROPHILS NFR BLD AUTO: 86.3 % — HIGH (ref 37–73)
PLATELET # BLD AUTO: 140 K/UL — LOW (ref 150–400)
RBC # BLD: 2.76 M/UL — LOW (ref 4.4–5.2)
RBC # FLD: 13.1 % — SIGNIFICANT CHANGE UP (ref 11–15.6)
T PALLIDUM AB TITR SER: NEGATIVE — SIGNIFICANT CHANGE UP
WBC # BLD: 15.7 K/UL — HIGH (ref 4.8–10.8)
WBC # FLD AUTO: 15.7 K/UL — HIGH (ref 4.8–10.8)

## 2018-07-28 RX ADMIN — Medication 30 MILLIGRAM(S): at 19:59

## 2018-07-28 RX ADMIN — SODIUM CHLORIDE 125 MILLILITER(S): 9 INJECTION, SOLUTION INTRAVENOUS at 04:53

## 2018-07-28 RX ADMIN — Medication 30 MILLIGRAM(S): at 01:56

## 2018-07-28 RX ADMIN — Medication 100 MILLIGRAM(S): at 20:56

## 2018-07-28 RX ADMIN — SIMETHICONE 80 MILLIGRAM(S): 80 TABLET, CHEWABLE ORAL at 20:57

## 2018-07-28 RX ADMIN — Medication 30 MILLIGRAM(S): at 01:41

## 2018-07-28 RX ADMIN — Medication 30 MILLIGRAM(S): at 12:02

## 2018-07-28 RX ADMIN — Medication 30 MILLIGRAM(S): at 12:17

## 2018-07-28 RX ADMIN — Medication 100 MILLIGRAM(S): at 12:02

## 2018-07-28 RX ADMIN — Medication 100 MILLIGRAM(S): at 20:00

## 2018-07-28 RX ADMIN — Medication 325 MILLIGRAM(S): at 12:02

## 2018-07-28 RX ADMIN — Medication 30 MILLIGRAM(S): at 20:59

## 2018-07-28 RX ADMIN — Medication 1 TABLET(S): at 12:01

## 2018-07-28 RX ADMIN — Medication 100 MILLIGRAM(S): at 04:53

## 2018-07-28 NOTE — DISCHARGE NOTE OB - CARE PROVIDER_API CALL
Lui Nagel), Obstetrics and Gynecology  [address] [place] [address]  Phone: [phone]  Fax: [phone]

## 2018-07-28 NOTE — PROGRESS NOTE ADULT - ASSESSMENT
23y year old  s/p primary  at 40wks 5days gestation for NRFHT POD#1.   Encourage ambulation & PO hydration and regular diet. Advised to notify if she continues to feel dizziness.   Encourage mother/baby interaction.   Follow up on CBC.  Plan for discharge on POD#3 if no complications arise.

## 2018-07-28 NOTE — DISCHARGE NOTE OB - CARE PLAN
Principal Discharge DX:	S/P primary low transverse   Goal:	Fast Recovery  Assessment and plan of treatment:	To follow up with Dr. Nagel's office in 1 week after discharge for wound check. Diet and activity as tolerated. Nothing per vagina until seen by cleared by Dr. Nagel.   Take medication as indicated. Please call the office sooner if any complications arise.

## 2018-07-28 NOTE — DISCHARGE NOTE OB - PLAN OF CARE
Fast Recovery To follow up with Dr. Nagel's office in 1 week after discharge for wound check. Diet and activity as tolerated. Nothing per vagina until seen by cleared by Dr. Nagel.   Take medication as indicated. Please call the office sooner if any complications arise.

## 2018-07-28 NOTE — DISCHARGE NOTE OB - PATIENT PORTAL LINK FT
You can access the [facility] Patient Portal, offered by [facility], by registering with the following website: [email or web address]

## 2018-07-28 NOTE — PROGRESS NOTE ADULT - SUBJECTIVE AND OBJECTIVE BOX
INTERVAL HPI/OVERNIGHT EVENTS:  23y Female s/p c section under spinal anesthesia with duramorph for post op analgesia on 7/27/18    Vital Signs Last 24 Hrs  T(C): 36.5 (28 Jul 2018 16:00), Max: 36.9 (28 Jul 2018 09:27)  T(F): 97.7 (28 Jul 2018 16:00), Max: 98.4 (28 Jul 2018 09:27)  HR: 93 (28 Jul 2018 16:00) (79 - 99)  BP: 103/68 (28 Jul 2018 16:00) (103/68 - 128/82)  BP(mean): --  RR: 18 (28 Jul 2018 16:00) (14 - 19)  SpO2: 96% (28 Jul 2018 04:34) (96% - 97%)    Patient seen, doing well, no anesthetic complications or complaints noted or reported.  Pain is controlled.

## 2018-07-28 NOTE — PROGRESS NOTE ADULT - SUBJECTIVE AND OBJECTIVE BOX
23y year old  s/p primary  at 40wks 5days gestation for NRFHT POD#1.   Patient seen and examined at bedside. Patient reports some dizziness this am when she got up to go to the bathroom.    Patient is ambulating, not yet eating, +PO hydration, +voiding, +Flatus, -BM, +Breast feeding and pain is well controlled.  Denies headache, SOB, CP, fever, chills, nausea, vomiting and calf pain.    VS:   Vital Signs Last 24 Hrs  T(C): 36.7 (2018 04:34), Max: 36.8 (2018 21:10)  T(F): 98 (2018 04:34), Max: 98.2 (2018 21:10)  HR: 84 (2018 04:34) (79 - 99)  BP: 109/64 (2018 04:34) (107/67 - 128/82)  BP(mean): --  RR: 18 (2018 04:34) (14 - 19)  SpO2: 96% (2018 04:34) (96% - 97%)    Physical Exam:  General: NAD  Abdomen: +BS, soft, some distention and tender on palpation. Fundus not felt due to distention and tenderness. [place] noted, clean incision site, healing well. Dressing clean, dry, intact.  Ext: No cyanosis, edema or calf tenderness.     Labs:                        11.7   12.7  )-----------( 159      ( 2018 09:00 )             35.6     Urinalysis Basic - ( 2018 09:00 )    Color: Yellow / Appearance: Slightly Turbid / S.010 / pH: x  Gluc: x / Ketone: Negative  / Bili: Negative / Urobili: Negative mg/dL   Blood: x / Protein: Negative mg/dL / Nitrite: Negative   Leuk Esterase: Moderate / RBC: 11-25 /HPF / WBC 11-25   Sq Epi: x / Non Sq Epi: Moderate / Bacteria: Few        Medication:  MEDICATIONS  (STANDING):  ceFAZolin   IVPB 2000 milliGRAM(s) IV Intermittent every 8 hours  dextrose 5% + lactated ringers. 1000 milliLiter(s) (125 mL/Hr) IV Continuous <Continuous>  diphtheria/tetanus/pertussis (acellular) Vaccine (ADAcel) 0.5 milliLiter(s) IntraMuscular once  ferrous    sulfate 325 milliGRAM(s) Oral daily  lactated ringers. 1000 milliLiter(s) (125 mL/Hr) IV Continuous <Continuous>  oxytocin Infusion 41.667 milliUNIT(s)/Min (125 mL/Hr) IV Continuous <Continuous>  oxytocin Infusion 41.667 milliUNIT(s)/Min (125 mL/Hr) IV Continuous <Continuous>  prenatal multivitamin 1 Tablet(s) Oral daily    MEDICATIONS  (PRN):  acetaminophen   Tablet 650 milliGRAM(s) Oral every 6 hours PRN For Temp greater than 38.5 C (101.3 F)  acetaminophen  IVPB. 1000 milliGRAM(s) IV Intermittent once PRN Moderate Pain (4 - 6)  diphenhydrAMINE   Capsule 25 milliGRAM(s) Oral every 6 hours PRN Itching  diphenhydrAMINE   Capsule 25 milliGRAM(s) Oral every 4 hours PRN Pruritus  diphenhydrAMINE   Injectable 25 milliGRAM(s) IV Push once PRN Itching  docusate sodium 100 milliGRAM(s) Oral two times a day PRN Stool Softening  glycerin Suppository - Adult 1 Suppository(s) Rectal at bedtime PRN Constipation  ibuprofen  Tablet 600 milliGRAM(s) Oral every 6 hours PRN Mild pain or headache  ketorolac   Injectable 30 milliGRAM(s) IV Push every 6 hours PRN Moderate Pain (4 - 6)  lanolin Ointment 1 Application(s) Topical every 3 hours PRN Sore Nipples  nalbuphine Injectable 5 milliGRAM(s) IV Push once PRN itching not responding to benadryl  nalbuphine Injectable 2.5 milliGRAM(s) IV Push every 6 hours PRN Pruritus  naloxone Injectable 0.1 milliGRAM(s) IV Push every 3 minutes PRN For ANY of the following changes in patient status:  A. RR LESS THAN 10 breaths per minute, B. Oxygen saturation LESS THAN 90%, C. Sedation score of 6  ondansetron Injectable 4 milliGRAM(s) IV Push every 6 hours PRN Nausea  oxyCODONE    5 mG/acetaminophen 325 mG 1 Tablet(s) Oral every 3 hours PRN Moderate Pain  oxyCODONE    5 mG/acetaminophen 325 mG 2 Tablet(s) Oral every 6 hours PRN Severe Pain  simethicone 80 milliGRAM(s) Chew every 4 hours PRN Gas

## 2018-07-28 NOTE — DISCHARGE NOTE OB - MEDICATION SUMMARY - MEDICATIONS TO TAKE
I will START or STAY ON the medications listed below when I get home from the hospital:    ibuprofen 600 mg oral tablet  -- 1 tab(s) by mouth every 6 hours, As needed, Mild pain or headache  -- Indication: For Mild-Mod pain    oxyCODONE-acetaminophen 5 mg-325 mg oral tablet  -- 1 tab(s) by mouth every 6 hours x 4 days, As Needed -Moderate Pain MDD:4 tablets daily  -- Indication: For Mod- Severe Pain    ferrous sulfate 325 mg (65 mg elemental iron) oral tablet  -- 1 tab(s) by mouth 3 times a day (with meals)   -- Check with your doctor before becoming pregnant.  Do not chew, break, or crush.  May discolor urine or feces.    -- Indication: For Anemia    Prenatal Multivitamins with Folic Acid 1 mg oral tablet  -- 1 tab(s) by mouth once a day  -- Indication: For Delivery    docusate sodium 100 mg oral capsule  -- 1 cap(s) by mouth 2 times a day, As needed, Stool Softening  -- Indication: For Constipation

## 2018-07-28 NOTE — DISCHARGE NOTE OB - HOSPITAL COURSE
22 yo now  s/p primary  at 40.5wks after failed vaginal delivery. She had an uncomplicated surgery and postpartum course.  Patient did well in recovery and was transferred to post partum floor. Post partum course was unremarkable. Upon discharge patient is tolerating PO intake and regular diet, voiding, having bowel movement and ambulating. Patient is in medically optimized condition to be discharged home.

## 2018-07-29 RX ORDER — IBUPROFEN 200 MG
1 TABLET ORAL
Qty: 60 | Refills: 0 | OUTPATIENT
Start: 2018-07-29 | End: 2018-08-12

## 2018-07-29 RX ORDER — FERROUS SULFATE 325(65) MG
1 TABLET ORAL
Qty: 90 | Refills: 0 | OUTPATIENT
Start: 2018-07-29 | End: 2018-08-27

## 2018-07-29 RX ORDER — DOCUSATE SODIUM 100 MG
1 CAPSULE ORAL
Qty: 60 | Refills: 0 | OUTPATIENT
Start: 2018-07-29 | End: 2018-08-27

## 2018-07-29 RX ADMIN — SIMETHICONE 80 MILLIGRAM(S): 80 TABLET, CHEWABLE ORAL at 04:32

## 2018-07-29 RX ADMIN — Medication 600 MILLIGRAM(S): at 23:54

## 2018-07-29 RX ADMIN — Medication 600 MILLIGRAM(S): at 04:32

## 2018-07-29 RX ADMIN — Medication 600 MILLIGRAM(S): at 14:15

## 2018-07-29 RX ADMIN — Medication 325 MILLIGRAM(S): at 11:56

## 2018-07-29 RX ADMIN — Medication 1 TABLET(S): at 11:56

## 2018-07-29 RX ADMIN — Medication 600 MILLIGRAM(S): at 13:15

## 2018-07-29 NOTE — PROGRESS NOTE ADULT - SUBJECTIVE AND OBJECTIVE BOX
23y year old  s/p primary  at 40wks 5days gestation for NRFHT POD#2.   Patient seen and examined at bedside.  Patient is ambulating,  eating, +PO hydration, +voiding, +Flatus, -BM, +Breast feeding and pain is well controlled.  Denies headache, SOB, CP, fever, chills, nausea, vomiting and calf pain.    Vital Signs Last 24 Hrs  T(C): 36.8 (2018 22:04), Max: 36.9 (2018 09:27)  T(F): 98.3 (2018 22:04), Max: 98.4 (2018 09:27)  HR: 96 (2018 22:04) (81 - 96)  BP: 102/66 (2018 22:04) (102/66 - 108/64)  RR: 18 (2018 22:04) (18 - 18)  SpO2: 98% (2018 22:04) (98% - 98%)    Physical Exam:  General: NAD  Abdomen: +BS, soft, some distention and tender on palpation. Fundus not felt due to distention and tenderness. [place] noted, clean incision site, healing well. Dressing clean, dry, intact.  Ext: No cyanosis, edema or calf tenderness.     Labs:                        11.7   12.7  )-----------( 159      ( 2018 09:00 )             35.6     Urinalysis Basic - ( 2018 09:00 )    Color: Yellow / Appearance: Slightly Turbid / S.010 / pH: x  Gluc: x / Ketone: Negative  / Bili: Negative / Urobili: Negative mg/dL   Blood: x / Protein: Negative mg/dL / Nitrite: Negative   Leuk Esterase: Moderate / RBC: 11-25 /HPF / WBC 11-25   Sq Epi: x / Non Sq Epi: Moderate / Bacteria: Few        Medication:  MEDICATIONS  (STANDING):  ceFAZolin   IVPB 2000 milliGRAM(s) IV Intermittent every 8 hours  dextrose 5% + lactated ringers. 1000 milliLiter(s) (125 mL/Hr) IV Continuous <Continuous>  diphtheria/tetanus/pertussis (acellular) Vaccine (ADAcel) 0.5 milliLiter(s) IntraMuscular once  ferrous    sulfate 325 milliGRAM(s) Oral daily  lactated ringers. 1000 milliLiter(s) (125 mL/Hr) IV Continuous <Continuous>  oxytocin Infusion 41.667 milliUNIT(s)/Min (125 mL/Hr) IV Continuous <Continuous>  oxytocin Infusion 41.667 milliUNIT(s)/Min (125 mL/Hr) IV Continuous <Continuous>  prenatal multivitamin 1 Tablet(s) Oral daily    MEDICATIONS  (PRN):  acetaminophen   Tablet 650 milliGRAM(s) Oral every 6 hours PRN For Temp greater than 38.5 C (101.3 F)  acetaminophen  IVPB. 1000 milliGRAM(s) IV Intermittent once PRN Moderate Pain (4 - 6)  diphenhydrAMINE   Capsule 25 milliGRAM(s) Oral every 6 hours PRN Itching  diphenhydrAMINE   Capsule 25 milliGRAM(s) Oral every 4 hours PRN Pruritus  diphenhydrAMINE   Injectable 25 milliGRAM(s) IV Push once PRN Itching  docusate sodium 100 milliGRAM(s) Oral two times a day PRN Stool Softening  glycerin Suppository - Adult 1 Suppository(s) Rectal at bedtime PRN Constipation  ibuprofen  Tablet 600 milliGRAM(s) Oral every 6 hours PRN Mild pain or headache  ketorolac   Injectable 30 milliGRAM(s) IV Push every 6 hours PRN Moderate Pain (4 - 6)  lanolin Ointment 1 Application(s) Topical every 3 hours PRN Sore Nipples  nalbuphine Injectable 5 milliGRAM(s) IV Push once PRN itching not responding to benadryl  nalbuphine Injectable 2.5 milliGRAM(s) IV Push every 6 hours PRN Pruritus  naloxone Injectable 0.1 milliGRAM(s) IV Push every 3 minutes PRN For ANY of the following changes in patient status:  A. RR LESS THAN 10 breaths per minute, B. Oxygen saturation LESS THAN 90%, C. Sedation score of 6  ondansetron Injectable 4 milliGRAM(s) IV Push every 6 hours PRN Nausea  oxyCODONE    5 mG/acetaminophen 325 mG 1 Tablet(s) Oral every 3 hours PRN Moderate Pain  oxyCODONE    5 mG/acetaminophen 325 mG 2 Tablet(s) Oral every 6 hours PRN Severe Pain  simethicone 80 milliGRAM(s) Chew every 4 hours PRN Gas

## 2018-07-29 NOTE — PROGRESS NOTE ADULT - ASSESSMENT
23y year old  s/p primary  at 40wks 5days gestation for NRFHT POD#2.     Encourage ambulation & PO hydration and regular diet.    Encourage mother/baby interaction.   Plan for discharge on POD#3 if no complications arise.   dvt proph : ambulation and  vcd boot

## 2018-07-30 VITALS — HEART RATE: 76 BPM | RESPIRATION RATE: 17 BRPM | DIASTOLIC BLOOD PRESSURE: 70 MMHG | SYSTOLIC BLOOD PRESSURE: 112 MMHG

## 2018-07-30 PROCEDURE — 82803 BLOOD GASES ANY COMBINATION: CPT

## 2018-07-30 PROCEDURE — 81001 URINALYSIS AUTO W/SCOPE: CPT

## 2018-07-30 PROCEDURE — 59025 FETAL NON-STRESS TEST: CPT

## 2018-07-30 PROCEDURE — T1013: CPT

## 2018-07-30 PROCEDURE — 86900 BLOOD TYPING SEROLOGIC ABO: CPT

## 2018-07-30 PROCEDURE — 76815 OB US LIMITED FETUS(S): CPT

## 2018-07-30 PROCEDURE — 59050 FETAL MONITOR W/REPORT: CPT

## 2018-07-30 PROCEDURE — 85027 COMPLETE CBC AUTOMATED: CPT

## 2018-07-30 PROCEDURE — 36415 COLL VENOUS BLD VENIPUNCTURE: CPT

## 2018-07-30 PROCEDURE — 86780 TREPONEMA PALLIDUM: CPT

## 2018-07-30 PROCEDURE — 86850 RBC ANTIBODY SCREEN: CPT

## 2018-07-30 PROCEDURE — 86901 BLOOD TYPING SEROLOGIC RH(D): CPT

## 2018-07-30 PROCEDURE — 90715 TDAP VACCINE 7 YRS/> IM: CPT

## 2018-07-30 PROCEDURE — G0463: CPT

## 2018-07-30 RX ORDER — TETANUS TOXOID, REDUCED DIPHTHERIA TOXOID AND ACELLULAR PERTUSSIS VACCINE, ADSORBED 5; 2.5; 8; 8; 2.5 [IU]/.5ML; [IU]/.5ML; UG/.5ML; UG/.5ML; UG/.5ML
0.5 SUSPENSION INTRAMUSCULAR ONCE
Qty: 0 | Refills: 0 | Status: COMPLETED | OUTPATIENT
Start: 2018-07-30 | End: 2018-07-30

## 2018-07-30 RX ADMIN — Medication 600 MILLIGRAM(S): at 11:15

## 2018-07-30 RX ADMIN — Medication 600 MILLIGRAM(S): at 10:38

## 2018-07-30 RX ADMIN — Medication 600 MILLIGRAM(S): at 00:45

## 2018-07-30 RX ADMIN — TETANUS TOXOID, REDUCED DIPHTHERIA TOXOID AND ACELLULAR PERTUSSIS VACCINE, ADSORBED 0.5 MILLILITER(S): 5; 2.5; 8; 8; 2.5 SUSPENSION INTRAMUSCULAR at 02:20

## 2018-07-30 NOTE — PROGRESS NOTE ADULT - SUBJECTIVE AND OBJECTIVE BOX
23y year old  s/p primary  at 40wks 5days gestation for NRFHT POD#3.   Patient seen and examined at bedside.  Patient is ambulating,  eating, +PO hydration, +voiding, +Flatus, +BM (1x yesterday), +Breast feeding, +formula and pain is well controlled. Reports mild vaginal bleeding having changed 4 pads yesterday.   Denies headache, SOB, CP, fever, chills, nausea, vomiting and calf pain.    Vital Signs Last 24 Hrs  T(C): 36.6 (2018 19:52), Max: 36.6 (2018 19:52)  T(F): 97.9 (2018 19:52), Max: 97.9 (2018 19:52)  HR: 90 (2018 19:52) (90 - 90)  BP: 110/67 (2018 19:52) (110/67 - 110/67)  RR: 18 (2018 19:52) (18 - 18)    Physical Exam:  General: NAD  Abdomen: +BS, soft, some distention and tender on palpation. Fundus not felt due to distention and tenderness. [place] noted, clean incision site, healing well. Dressing clean, dry, intact.  Ext: No cyanosis, edema or calf tenderness.   CV: RRR, no m/g/r  Resp: CTAB/L    Labs:                        11.7   12.7  )-----------( 159      ( 2018 09:00 )             35.6     Urinalysis Basic - ( 2018 09:00 )    Color: Yellow / Appearance: Slightly Turbid / S.010 / pH: x  Gluc: x / Ketone: Negative  / Bili: Negative / Urobili: Negative mg/dL   Blood: x / Protein: Negative mg/dL / Nitrite: Negative   Leuk Esterase: Moderate / RBC: 11-25 /HPF / WBC 11-25   Sq Epi: x / Non Sq Epi: Moderate / Bacteria: Few        Medication:  MEDICATIONS  (STANDING):  dextrose 5% + lactated ringers. 1000 milliLiter(s) (125 mL/Hr) IV Continuous <Continuous>  ferrous    sulfate 325 milliGRAM(s) Oral daily  lactated ringers. 1000 milliLiter(s) (125 mL/Hr) IV Continuous <Continuous>  oxytocin Infusion 41.667 milliUNIT(s)/Min (125 mL/Hr) IV Continuous <Continuous>  oxytocin Infusion 41.667 milliUNIT(s)/Min (125 mL/Hr) IV Continuous <Continuous>  prenatal multivitamin 1 Tablet(s) Oral daily    MEDICATIONS  (PRN):  acetaminophen   Tablet 650 milliGRAM(s) Oral every 6 hours PRN For Temp greater than 38.5 C (101.3 F)  acetaminophen  IVPB. 1000 milliGRAM(s) IV Intermittent once PRN Moderate Pain (4 - 6)  diphenhydrAMINE   Capsule 25 milliGRAM(s) Oral every 6 hours PRN Itching  diphenhydrAMINE   Capsule 25 milliGRAM(s) Oral every 4 hours PRN Pruritus  diphenhydrAMINE   Injectable 25 milliGRAM(s) IV Push once PRN Itching  docusate sodium 100 milliGRAM(s) Oral two times a day PRN Stool Softening  glycerin Suppository - Adult 1 Suppository(s) Rectal at bedtime PRN Constipation  ibuprofen  Tablet 600 milliGRAM(s) Oral every 6 hours PRN Mild pain or headache  ketorolac   Injectable 30 milliGRAM(s) IV Push every 6 hours PRN Moderate Pain (4 - 6)  lanolin Ointment 1 Application(s) Topical every 3 hours PRN Sore Nipples  nalbuphine Injectable 5 milliGRAM(s) IV Push once PRN itching not responding to benadryl  nalbuphine Injectable 2.5 milliGRAM(s) IV Push every 6 hours PRN Pruritus  naloxone Injectable 0.1 milliGRAM(s) IV Push every 3 minutes PRN For ANY of the following changes in patient status:  A. RR LESS THAN 10 breaths per minute, B. Oxygen saturation LESS THAN 90%, C. Sedation score of 6  ondansetron Injectable 4 milliGRAM(s) IV Push every 6 hours PRN Nausea  oxyCODONE    5 mG/acetaminophen 325 mG 1 Tablet(s) Oral every 3 hours PRN Moderate Pain  oxyCODONE    5 mG/acetaminophen 325 mG 2 Tablet(s) Oral every 6 hours PRN Severe Pain  simethicone 80 milliGRAM(s) Chew every 4 hours PRN Gas

## 2018-07-30 NOTE — PROGRESS NOTE ADULT - ASSESSMENT
23y year old  s/p primary  at 40wks 5days gestation for NRFHT POD#3.     Encourage ambulation & PO hydration and regular diet.    Encourage mother/baby interaction.   Plan for discharge on POD#3 if no complications arise.   dvt proph : ambulation and  vcd boot

## 2018-07-31 ENCOUNTER — APPOINTMENT (OUTPATIENT)
Dept: CARDIOLOGY | Facility: CLINIC | Age: 23
End: 2018-07-31

## 2018-08-02 DIAGNOSIS — R55 SYNCOPE AND COLLAPSE: ICD-10-CM

## 2018-08-02 DIAGNOSIS — Z34.93 ENCOUNTER FOR SUPERVISION OF NORMAL PREGNANCY, UNSPECIFIED, THIRD TRIMESTER: ICD-10-CM

## 2018-08-02 DIAGNOSIS — Z3A.39 39 WEEKS GESTATION OF PREGNANCY: ICD-10-CM

## 2018-08-08 ENCOUNTER — APPOINTMENT (OUTPATIENT)
Dept: OBGYN | Facility: CLINIC | Age: 23
End: 2018-08-08
Payer: MEDICAID

## 2018-08-08 VITALS — SYSTOLIC BLOOD PRESSURE: 100 MMHG | HEART RATE: 118 BPM | DIASTOLIC BLOOD PRESSURE: 69 MMHG

## 2018-08-08 VITALS — BODY MASS INDEX: 26.24 KG/M2 | HEIGHT: 61 IN | WEIGHT: 139 LBS

## 2018-08-08 DIAGNOSIS — Z09 ENCOUNTER FOR FOLLOW-UP EXAMINATION AFTER COMPLETED TREATMENT FOR CONDITIONS OTHER THAN MALIGNANT NEOPLASM: ICD-10-CM

## 2018-08-08 DIAGNOSIS — R10.2 PELVIC AND PERINEAL PAIN: ICD-10-CM

## 2018-08-08 PROCEDURE — 99213 OFFICE O/P EST LOW 20 MIN: CPT

## 2018-09-17 ENCOUNTER — APPOINTMENT (OUTPATIENT)
Dept: OBGYN | Facility: CLINIC | Age: 23
End: 2018-09-17
Payer: MEDICAID

## 2018-09-17 VITALS
HEART RATE: 83 BPM | DIASTOLIC BLOOD PRESSURE: 69 MMHG | HEIGHT: 61 IN | WEIGHT: 143 LBS | BODY MASS INDEX: 27 KG/M2 | SYSTOLIC BLOOD PRESSURE: 110 MMHG

## 2018-09-17 PROCEDURE — 99214 OFFICE O/P EST MOD 30 MIN: CPT

## 2018-09-18 ENCOUNTER — OTHER (OUTPATIENT)
Age: 23
End: 2018-09-18

## 2018-09-18 ENCOUNTER — MOBILE ON CALL (OUTPATIENT)
Age: 23
End: 2018-09-18

## 2018-09-18 LAB
C TRACH RRNA SPEC QL NAA+PROBE: NOT DETECTED
HPV HIGH+LOW RISK DNA PNL CVX: NOT DETECTED
N GONORRHOEA RRNA SPEC QL NAA+PROBE: NOT DETECTED
SOURCE TP AMPLIFICATION: NORMAL

## 2018-09-18 RX ORDER — CALCIUM CITRATE, IRON PENTACARBONYL, CHOLECALCIFEROL, .ALPHA.-TOCOPHEROL, DL-, PYRIDOXINE HYDROCHLORIDE, FOLIC ACID, DOCUSATE SODIUM, AND DOCONEXENT 104; 27; 400; 30; 25; 1; 50; 260 MG/1; MG/1; [IU]/1; [IU]/1; MG/1; MG/1; MG/1; MG/1
27-1-260 CAPSULE, GELATIN COATED ORAL
Qty: 1 | Refills: 4 | Status: COMPLETED | COMMUNITY
Start: 2018-02-14 | End: 2018-09-18

## 2018-09-21 LAB — CYTOLOGY CVX/VAG DOC THIN PREP: NORMAL

## 2018-09-28 ENCOUNTER — APPOINTMENT (OUTPATIENT)
Dept: OBGYN | Facility: CLINIC | Age: 23
End: 2018-09-28

## 2018-09-28 VITALS
SYSTOLIC BLOOD PRESSURE: 115 MMHG | BODY MASS INDEX: 26.43 KG/M2 | HEART RATE: 74 BPM | HEIGHT: 61 IN | DIASTOLIC BLOOD PRESSURE: 79 MMHG | WEIGHT: 140 LBS

## 2020-04-11 ENCOUNTER — EMERGENCY (EMERGENCY)
Facility: HOSPITAL | Age: 25
LOS: 1 days | Discharge: HOME | End: 2020-04-11
Attending: STUDENT IN AN ORGANIZED HEALTH CARE EDUCATION/TRAINING PROGRAM
Payer: MEDICAID

## 2020-04-11 VITALS
TEMPERATURE: 98 F | OXYGEN SATURATION: 99 % | HEART RATE: 82 BPM | RESPIRATION RATE: 18 BRPM | DIASTOLIC BLOOD PRESSURE: 78 MMHG | SYSTOLIC BLOOD PRESSURE: 118 MMHG

## 2020-04-11 VITALS
TEMPERATURE: 98 F | HEART RATE: 85 BPM | SYSTOLIC BLOOD PRESSURE: 113 MMHG | DIASTOLIC BLOOD PRESSURE: 75 MMHG | WEIGHT: 123.02 LBS | RESPIRATION RATE: 18 BRPM | OXYGEN SATURATION: 100 % | HEIGHT: 59 IN

## 2020-04-11 PROCEDURE — T1013: CPT

## 2020-04-11 PROCEDURE — 99283 EMERGENCY DEPT VISIT LOW MDM: CPT | Mod: 25

## 2020-04-11 PROCEDURE — 90471 IMMUNIZATION ADMIN: CPT

## 2020-04-11 PROCEDURE — 90715 TDAP VACCINE 7 YRS/> IM: CPT

## 2020-04-11 PROCEDURE — 99283 EMERGENCY DEPT VISIT LOW MDM: CPT

## 2020-04-11 RX ORDER — TETANUS TOXOID, REDUCED DIPHTHERIA TOXOID AND ACELLULAR PERTUSSIS VACCINE, ADSORBED 5; 2.5; 8; 8; 2.5 [IU]/.5ML; [IU]/.5ML; UG/.5ML; UG/.5ML; UG/.5ML
0.5 SUSPENSION INTRAMUSCULAR ONCE
Refills: 0 | Status: COMPLETED | OUTPATIENT
Start: 2020-04-11 | End: 2020-04-11

## 2020-04-11 RX ORDER — ACETAMINOPHEN 500 MG
975 TABLET ORAL ONCE
Refills: 0 | Status: COMPLETED | OUTPATIENT
Start: 2020-04-11 | End: 2020-04-11

## 2020-04-11 RX ADMIN — Medication 975 MILLIGRAM(S): at 19:00

## 2020-04-11 RX ADMIN — TETANUS TOXOID, REDUCED DIPHTHERIA TOXOID AND ACELLULAR PERTUSSIS VACCINE, ADSORBED 0.5 MILLILITER(S): 5; 2.5; 8; 8; 2.5 SUSPENSION INTRAMUSCULAR at 19:00

## 2020-04-11 NOTE — ED PROVIDER NOTE - CLINICAL SUMMARY MEDICAL DECISION MAKING FREE TEXT BOX
24 year old female presents s/p fall on face. + abrasion. No LOC, nl neuro exam. Will treat with Tylenol adacel and reassess. Likely dc with heard injury precautions.

## 2020-04-11 NOTE — ED PROVIDER NOTE - NEUROLOGICAL, MLM
Alert and oriented, no focal deficits, no motor or sensory deficits, PERRLA, nl motor, nl sensation, EOMI, no nystagmus

## 2020-04-11 NOTE — ED PROVIDER NOTE - SKIN, MLM
Skin normal color for race, warm, dry and intact. No evidence of rash, .2x.2 cm abrasion to bridge no nose, no active bleeding

## 2020-04-11 NOTE — ED PROVIDER NOTE - PROGRESS NOTE DETAILS
Pt reports HA has improved. Will d/c with head injury precautions. Patient was to be discharged but left before being provided with DC paperwork, reassessment and return precautions.

## 2020-04-11 NOTE — ED PROVIDER NOTE - ENMT, MLM
Airway patent, Nasal mucosa clear. Mouth with normal mucosa. Throat has no vesicles, no oropharyngeal exudates and uvula is midline. No nasal septal hematoma, mild swelling to bridge of nose

## 2020-04-11 NOTE — ED PROVIDER NOTE - ATTENDING CONTRIBUTION TO CARE
I performed a face to face history and physical exam of the patient and discussed their management with the resident/ACP. I reviewed the resident/ACP's note and agree with the documented findings and plan of care.    Pt tripped and fell and landed on her nose.  no loc. no other complaints.    physical - nose with small abrasion. mild swelling. no deformity.  no septal hematoma.    plan - reassurance and outpatient f/up.

## 2020-04-11 NOTE — ED PROVIDER NOTE - PATIENT PORTAL LINK FT
You can access the FollowMyHealth Patient Portal offered by [facility] by registering at the following website: [email or web address]. By joining [employer]’s FollowMyHealth portal, you will also be able to view your health information using other applications (apps) compatible with our system.

## 2020-04-11 NOTE — ED PROVIDER NOTE - OBJECTIVE STATEMENT
24 year old female with no PMHx presents to the ED s/p fall which occurred 1 hour ago. Pt reports she was mopping when she accidentally tripped and fell, landing forward on her face. Pt c/o abrasion to the nose, facial pain and mild HA. Last tetanus unknown. Pt took Motrin which mildly relieved her pain. Denies LOC, vomiting, dizziness, visual changes, neck or back pain.

## 2020-04-11 NOTE — ED ADULT NURSE NOTE - OBJECTIVE STATEMENT
25 y/o female s/p fall at home while cleaning about 1 hour ago.  Denies LOC, headache, double blurry vision.  States floor was slippery.  Denies PMH.  Csection.  Pain 5/10

## 2020-04-11 NOTE — ED PROVIDER NOTE - NSFOLLOWUPINSTRUCTIONS_ED_ALL_ED_FT
-Vuelva al departamento de emergencias para empeorar la HA, vómitos, mareos, cambios en la visión, aumento del letargo o cualquier síntoma nuevo o preocupante.  -[place] Motrin / Tylenol según sea necesario para el dolor.